# Patient Record
Sex: MALE | Race: WHITE | Employment: FULL TIME | URBAN - NONMETROPOLITAN AREA
[De-identification: names, ages, dates, MRNs, and addresses within clinical notes are randomized per-mention and may not be internally consistent; named-entity substitution may affect disease eponyms.]

---

## 2022-09-19 ENCOUNTER — HOSPITAL ENCOUNTER (INPATIENT)
Age: 42
LOS: 3 days | Discharge: LEFT BEFORE DISCHARGE | DRG: 897 | End: 2022-09-22
Attending: STUDENT IN AN ORGANIZED HEALTH CARE EDUCATION/TRAINING PROGRAM | Admitting: PEDIATRICS

## 2022-09-19 DIAGNOSIS — F10.930 ALCOHOL WITHDRAWAL SYNDROME WITHOUT COMPLICATION (HCC): ICD-10-CM

## 2022-09-19 DIAGNOSIS — R45.851 SUICIDAL IDEATION: Primary | ICD-10-CM

## 2022-09-19 LAB
ACETAMINOPHEN LEVEL: < 5 UG/ML (ref 0–20)
ALBUMIN SERPL-MCNC: 4.5 G/DL (ref 3.5–5.1)
ALP BLD-CCNC: 109 U/L (ref 38–126)
ALT SERPL-CCNC: 20 U/L (ref 11–66)
AMPHETAMINE+METHAMPHETAMINE URINE SCREEN: NEGATIVE
ANION GAP SERPL CALCULATED.3IONS-SCNC: 16 MEQ/L (ref 8–16)
AST SERPL-CCNC: 32 U/L (ref 5–40)
BACTERIA: ABNORMAL /HPF
BARBITURATE QUANTITATIVE URINE: NEGATIVE
BASOPHILS # BLD: 0.6 %
BASOPHILS ABSOLUTE: 0.1 THOU/MM3 (ref 0–0.1)
BENZODIAZEPINE QUANTITATIVE URINE: NEGATIVE
BILIRUB SERPL-MCNC: 0.6 MG/DL (ref 0.3–1.2)
BILIRUBIN URINE: NEGATIVE
BLOOD, URINE: NEGATIVE
BUN BLDV-MCNC: 16 MG/DL (ref 7–22)
CALCIUM SERPL-MCNC: 8.3 MG/DL (ref 8.5–10.5)
CANNABINOID QUANTITATIVE URINE: NEGATIVE
CASTS 2: ABNORMAL /LPF
CASTS UA: ABNORMAL /LPF
CHARACTER, URINE: ABNORMAL
CHLORIDE BLD-SCNC: 102 MEQ/L (ref 98–111)
CO2: 23 MEQ/L (ref 23–33)
COCAINE METABOLITE QUANTITATIVE URINE: NEGATIVE
COLOR: YELLOW
CREAT SERPL-MCNC: 0.6 MG/DL (ref 0.4–1.2)
CRYSTALS, UA: ABNORMAL
EKG ATRIAL RATE: 94 BPM
EKG P AXIS: 58 DEGREES
EKG P-R INTERVAL: 138 MS
EKG Q-T INTERVAL: 362 MS
EKG QRS DURATION: 92 MS
EKG QTC CALCULATION (BAZETT): 452 MS
EKG R AXIS: 66 DEGREES
EKG T AXIS: 61 DEGREES
EKG VENTRICULAR RATE: 94 BPM
EOSINOPHIL # BLD: 2.4 %
EOSINOPHILS ABSOLUTE: 0.3 THOU/MM3 (ref 0–0.4)
EPITHELIAL CELLS, UA: ABNORMAL /HPF
ERYTHROCYTE [DISTWIDTH] IN BLOOD BY AUTOMATED COUNT: 13.7 % (ref 11.5–14.5)
ERYTHROCYTE [DISTWIDTH] IN BLOOD BY AUTOMATED COUNT: 44.8 FL (ref 35–45)
ETHYL ALCOHOL, SERUM: 0.32 %
GFR SERPL CREATININE-BSD FRML MDRD: > 90 ML/MIN/1.73M2
GLUCOSE BLD-MCNC: 139 MG/DL (ref 70–108)
GLUCOSE URINE: NEGATIVE MG/DL
HCT VFR BLD CALC: 45.7 % (ref 42–52)
HEMOGLOBIN: 15.6 GM/DL (ref 14–18)
IMMATURE GRANS (ABS): 0.02 THOU/MM3 (ref 0–0.07)
IMMATURE GRANULOCYTES: 0.2 %
KETONES, URINE: NEGATIVE
LEUKOCYTE ESTERASE, URINE: NEGATIVE
LYMPHOCYTES # BLD: 18.3 %
LYMPHOCYTES ABSOLUTE: 2.1 THOU/MM3 (ref 1–4.8)
MCH RBC QN AUTO: 30.6 PG (ref 26–33)
MCHC RBC AUTO-ENTMCNC: 34.1 GM/DL (ref 32.2–35.5)
MCV RBC AUTO: 89.6 FL (ref 80–94)
MISCELLANEOUS 2: ABNORMAL
MONOCYTES # BLD: 3.6 %
MONOCYTES ABSOLUTE: 0.4 THOU/MM3 (ref 0.4–1.3)
NITRITE, URINE: NEGATIVE
NUCLEATED RED BLOOD CELLS: 0 /100 WBC
OPIATES, URINE: NEGATIVE
OSMOLALITY CALCULATION: 284.7 MOSMOL/KG (ref 275–300)
OXYCODONE: NEGATIVE
PH UA: 7 (ref 5–9)
PHENCYCLIDINE QUANTITATIVE URINE: NEGATIVE
PLATELET # BLD: 232 THOU/MM3 (ref 130–400)
PMV BLD AUTO: 10.2 FL (ref 9.4–12.4)
POTASSIUM REFLEX MAGNESIUM: 3.9 MEQ/L (ref 3.5–5.2)
PROTEIN UA: ABNORMAL
RBC # BLD: 5.1 MILL/MM3 (ref 4.7–6.1)
RBC URINE: ABNORMAL /HPF
RENAL EPITHELIAL, UA: ABNORMAL
SALICYLATE, SERUM: < 0.3 MG/DL (ref 2–10)
SARS-COV-2, NAAT: NOT  DETECTED
SEG NEUTROPHILS: 74.9 %
SEGMENTED NEUTROPHILS ABSOLUTE COUNT: 8.6 THOU/MM3 (ref 1.8–7.7)
SODIUM BLD-SCNC: 141 MEQ/L (ref 135–145)
SPECIFIC GRAVITY, URINE: 1.02 (ref 1–1.03)
TOTAL PROTEIN: 6.9 G/DL (ref 6.1–8)
UROBILINOGEN, URINE: 1 EU/DL (ref 0–1)
WBC # BLD: 11.5 THOU/MM3 (ref 4.8–10.8)
WBC UA: ABNORMAL /HPF
YEAST: ABNORMAL

## 2022-09-19 PROCEDURE — 99223 1ST HOSP IP/OBS HIGH 75: CPT | Performed by: PEDIATRICS

## 2022-09-19 PROCEDURE — 2060000000 HC ICU INTERMEDIATE R&B

## 2022-09-19 PROCEDURE — 80179 DRUG ASSAY SALICYLATE: CPT

## 2022-09-19 PROCEDURE — 36415 COLL VENOUS BLD VENIPUNCTURE: CPT

## 2022-09-19 PROCEDURE — 99285 EMERGENCY DEPT VISIT HI MDM: CPT

## 2022-09-19 PROCEDURE — 81001 URINALYSIS AUTO W/SCOPE: CPT

## 2022-09-19 PROCEDURE — 87635 SARS-COV-2 COVID-19 AMP PRB: CPT

## 2022-09-19 PROCEDURE — 80307 DRUG TEST PRSMV CHEM ANLYZR: CPT

## 2022-09-19 PROCEDURE — 85025 COMPLETE CBC W/AUTO DIFF WBC: CPT

## 2022-09-19 PROCEDURE — 93010 ELECTROCARDIOGRAM REPORT: CPT | Performed by: INTERNAL MEDICINE

## 2022-09-19 PROCEDURE — 80143 DRUG ASSAY ACETAMINOPHEN: CPT

## 2022-09-19 PROCEDURE — 93005 ELECTROCARDIOGRAM TRACING: CPT | Performed by: STUDENT IN AN ORGANIZED HEALTH CARE EDUCATION/TRAINING PROGRAM

## 2022-09-19 PROCEDURE — 96372 THER/PROPH/DIAG INJ SC/IM: CPT

## 2022-09-19 PROCEDURE — 80053 COMPREHEN METABOLIC PANEL: CPT

## 2022-09-19 PROCEDURE — 82077 ASSAY SPEC XCP UR&BREATH IA: CPT

## 2022-09-19 PROCEDURE — 6360000002 HC RX W HCPCS: Performed by: STUDENT IN AN ORGANIZED HEALTH CARE EDUCATION/TRAINING PROGRAM

## 2022-09-19 RX ORDER — MIDAZOLAM HYDROCHLORIDE 10 MG/2ML
5 INJECTION, SOLUTION INTRAMUSCULAR; INTRAVENOUS ONCE
Status: COMPLETED | OUTPATIENT
Start: 2022-09-19 | End: 2022-09-19

## 2022-09-19 RX ADMIN — MIDAZOLAM 5 MG: 5 INJECTION INTRAMUSCULAR; INTRAVENOUS at 16:27

## 2022-09-19 RX ADMIN — MIDAZOLAM 5 MG: 5 INJECTION INTRAMUSCULAR; INTRAVENOUS at 21:10

## 2022-09-19 ASSESSMENT — ENCOUNTER SYMPTOMS
EYES NEGATIVE: 1
RESPIRATORY NEGATIVE: 1
ALLERGIC/IMMUNOLOGIC NEGATIVE: 1
GASTROINTESTINAL NEGATIVE: 1

## 2022-09-19 ASSESSMENT — SLEEP AND FATIGUE QUESTIONNAIRES
DO YOU HAVE DIFFICULTY SLEEPING: YES
AVERAGE NUMBER OF SLEEP HOURS: 6
SLEEP PATTERN: DISTURBED/INTERRUPTED SLEEP;DIFFICULTY FALLING ASLEEP
DO YOU USE A SLEEP AID: NO

## 2022-09-19 ASSESSMENT — LIFESTYLE VARIABLES
HOW MANY STANDARD DRINKS CONTAINING ALCOHOL DO YOU HAVE ON A TYPICAL DAY: 10 OR MORE
HOW OFTEN DO YOU HAVE A DRINK CONTAINING ALCOHOL: 4 OR MORE TIMES A WEEK

## 2022-09-19 ASSESSMENT — PATIENT HEALTH QUESTIONNAIRE - PHQ9: SUM OF ALL RESPONSES TO PHQ QUESTIONS 1-9: 22

## 2022-09-19 NOTE — ED PROVIDER NOTES
[place] ENCOUNTER          Pt Name: Nallely Cortes  MRN: [id number]  [place] 1980  Date of evaluation: 9/19/2022  Treating Resident Physician: Bertha Santizo MD  Supervising Physician: Dr. Moo Sagastume       Chief Complaint   Patient presents with    Suicidal     History obtained from the patient. HISTORY OF PRESENT ILLNESS    HPI  Nallely Cortes is a 43 y.o. male with PMHx of depression who presents to the emergency department for evaluation of suicidal ideation. The patient states that over the last 2 weeks he has been drinking heavily stating that he is trying to drink himself to death. He has had suicidal ideation at this time and has multiple plans including drinking heavily which she states is his slow plan and is fast plan is to get a gun to shoot himself in the head. The patient has access to firearms at his parents's stores but does not carry firearms in his own home. He states that he is also been driving inebriated around town and that he is concerned that he may hurt someone. For this reason he presents emergency department on his own. He denies any toxic ingestions or attempting overdose with any medications. He has been drinking about 2/5 of whiskey a day. States that he recently was diagnosed with malignancy of the throat and this has been causing him chronic and worsening pain. He denies homicidal ideation, hallucinations, chest pain, shortness of breath. States that he also has a history of \"alcoholic epilepsy\" and has had 2 seizures in the last 24 hours when he was heavily inebriated. States that he is not taking any antiepileptic drugs. The patient has no other acute complaints at this time. REVIEW OF SYSTEMS   Review of Systems   Constitutional: Negative. HENT: Negative. Eyes: Negative. Respiratory: Negative. Cardiovascular: Negative. Gastrointestinal: Negative. Endocrine: Negative. Genitourinary: Negative. Musculoskeletal: Negative. Skin: Negative. Allergic/Immunologic: Negative. Neurological:  Positive for seizures. Hematological: Negative. Psychiatric/Behavioral:  Positive for suicidal ideas. Negative for decreased concentration, hallucinations and sleep disturbance. PAST MEDICAL AND SURGICAL HISTORY   No past medical history on file. No past surgical history on file. MEDICATIONS   No current facility-administered medications for this encounter. No current outpatient medications on file. SOCIAL HISTORY     Social History     Social History Narrative    Not on file            ALLERGIES   No Known Allergies      FAMILY HISTORY   No family history on file. PREVIOUS RECORDS   Previous records reviewed: I reviewed the patient's past medical records including relevant labs, imaging and procedures. \        PHYSICAL EXAM     ED Triage Vitals [09/19/22 1404]   BP Temp Temp Source Heart Rate Resp SpO2 Height Weight   (!) 133/98 97.5 °F (36.4 °C) Oral 89 18 96 % 6' 4\" (1.93 m) 155 lb (70.3 kg)     Initial vital signs and nursing assessment reviewed and abnormal from hypertension . Body mass index is 18.87 kg/m². Pulsoximetry is normal per my interpretation. Additional Vital Signs:  Vitals:    09/19/22 1920   BP: 129/88   Pulse: 79   Resp: 17   Temp: 97.7 °F (36.5 °C)   SpO2: 100%       Physical Exam  Constitutional:       General: He is not in acute distress. Appearance: Normal appearance. He is not ill-appearing or diaphoretic. HENT:      Head: Normocephalic and atraumatic. Right Ear: External ear normal.      Left Ear: External ear normal.      Nose: Nose normal.      Mouth/Throat:      Mouth: Mucous membranes are moist.      Pharynx: Oropharynx is clear. Eyes:      General: No scleral icterus. Conjunctiva/sclera: Conjunctivae normal.   Cardiovascular:      Rate and Rhythm: Normal rate and regular rhythm. Pulses: Normal pulses. Heart sounds: Normal heart sounds. Pulmonary:      Effort: Pulmonary effort is normal.      Breath sounds: Normal breath sounds. Abdominal:      General: Abdomen is flat. Palpations: Abdomen is soft. Skin:     General: Skin is warm and dry. Capillary Refill: Capillary refill takes less than 2 seconds. Neurological:      General: No focal deficit present. Mental Status: He is alert and oriented to person, place, and time. Mental status is at baseline.    Psychiatric:         Mood and Affect: Mood normal.         Behavior: Behavior normal.           ED RESULTS   Laboratory results:  Labs Reviewed   CBC WITH AUTO DIFFERENTIAL - Abnormal; Notable for the following components:       Result Value    WBC 11.5 (*)     Segs Absolute 8.6 (*)     All other components within normal limits   COMPREHENSIVE METABOLIC PANEL W/ REFLEX TO MG FOR LOW K - Abnormal; Notable for the following components:    Glucose 139 (*)     Calcium 8.3 (*)     All other components within normal limits   SALICYLATE LEVEL - Abnormal; Notable for the following components:    Salicylate, Serum < 0.3 (*)     All other components within normal limits   URINE WITH REFLEXED MICRO - Abnormal; Notable for the following components:    Protein, UA TRACE (*)     Character, Urine CLOUDY (*)     All other components within normal limits   COVID-19, RAPID   URINE DRUG SCREEN   ETHANOL   ACETAMINOPHEN LEVEL   ANION GAP   GLOMERULAR FILTRATION RATE, ESTIMATED   OSMOLALITY       Radiologic studies results:  No orders to display       ED Medications administered this visit:   Medications   midazolam PF (VERSED) injection 5 mg (5 mg IntraMUSCular Given 9/19/22 1627)   midazolam PF (VERSED) injection 5 mg (5 mg IntraMUSCular Given 9/19/22 2110)         ED COURSE     ED Course as of 09/19/22 2243   Mon Sep 19, 2022   1927 ETHYL ALCOHOL, SERUM: 0.32 [JT]      ED Course User Index  [JT] Loni Pimentel MD           MEDICAL DECISION MAKING   Initial Assessment: This is a 80-year-old male coming in with suicidal ideation with multiple plans and access to firearms. No homicidal ideation or auditory or visual hallucinations elicited. Patient appears well on exam.  Reassuring and unremarkable physical exam.    Psychiatric clearance labs  GALILEA consultation  [address]      Given the patient's above chief complaint and findings on history and physical examination, I thought it was appropriate to consider the following emergency medical conditions:  Suicidal ideation, mental health crisis, alcohol use disorder  Although some of these diagnoses are unlikely they were considered in my medical decision making. MDM: Patient represents an immediate and likely threat to self and therefore will require [address] [place]. He has multiple plans in place and does have access to firearms and also has a new chronic diagnosis of malignancy and is also binge drinking. He also endorses a history of seizures induced by alcohol and states that he might of had seizures, no seizure-like activity noticed here and no history of seizures seen in his medical charts. Versed given for mild agitation. During patient's course here in the emergency department, he developed more agitation and began also developing resting tremors. Given that the patient has been binge drinking chronically and has not had any EtOH or in the emergency department I suspect that he is going into withdrawal.  No perceptual disturbances and no seizures noted here. However, it would be appropriate to admit the patient for alcohol withdrawal before he is medically cleared for psychiatric admission. I did page the admission to the hospitalist Dr. Camille Hensley who graciously excepted the admission. Patient care is signed out to the ED resident on.       MEDICATION CHANGES     New Prescriptions    No medications on file         FINAL DISPOSITION     Final diagnoses:   Suicidal ideation   Alcohol withdrawal syndrome without complication ([facility] [address]     Condition: condition: fair  Dispo: Patient care signed out to ED resident      This transcription was electronically signed. Parts of this transcriptions may have been dictated by use of voice recognition software and electronically transcribed, and parts may have been transcribed with the assistance of an ED scribe. The transcription may contain errors not detected in proofreading. Please refer to my supervising physician's documentation if my documentation differs.     Electronically Signed: Amada Ward MD, 09/19/22, 10:43 PM         Amada Ward MD  Resident  09/19/22 0921

## 2022-09-19 NOTE — ED TRIAGE NOTES
Pt presents to the ED through triage with c.c suicidal thoughts. Pt states he has been drinking alcohol heavily in attempt to end his life. Pt reports that he also thinks about shooting himself in the head. Pt reports last drink was today at 0800. Mother at bedside. Level A paged. Patient placed in safe room that is ligature resistant with continuous monitoring in place. Provider notified, requested an assessment by behavioral health . Patient belongings secured in a locked lockers outside of the room. Explained suicide prevention precautions to the patient including constant observer.

## 2022-09-19 NOTE — PROGRESS NOTES
Chief Complaint:   Suicidal      Provisional Diagnosis: Major Depressive Disorder      Risk, Psychosocial and Contextual Factors: Limited support, no outpatient provider      Current  Treatment: Denies      Present Suicidal Behavior:    Verbal: Yes    Attempt: Denies      Access to Weapons: Denies      C-SSRS Current Suicide Risk: Low, Moderate or High: High      Past Suicidal Behavior:    Verbal: Yes    Attempts: Denies      Self-Injurious/Self-Mutilation: Denies      Traumatic Event Within Past 2 Weeks: Recently diagnosed with throat cancer      Current Abuse:  Denies      Legal: Denies      Violence: Denies      Protective Factors: Motivated for change      Housing: Lives alone in [address] [place] [address] Summary:      Patient is a 43year old male who presents to ED voluntarily reporting suicidal thoughts. Patient reports he has been attempting to drink himself to death for the past two weeks. Patient states \"If I had access to a gun I would have shot myself in the head before coming here\". Patient explains he has limited support as a single male and he recently received a throat cancer diagnosis which he has struggled with. Patient denies any homicidal ideation, denies hallucinations. Patient reports history of opiate abuse in the early 2000s which he did receive inpatient treatment for. Patient reports he had a seizure last night around 9pm and woke up on the ground 12 hours later. Patient states this happened twice yesterday. Patient reports alcohol use regularly. Patient reports he gave his keys to his friend for fear he was going to drive and harm himself or someone else. Level of Care Disposition:      1510: Patient placed on [address] [facility] by Dr. Chris Gomes.     3277: Patient becoming agitated, pacing safe rooms and asking to go home. 1604: Patient continues to pace the safe room, verbally escalating. Medical provider aware. 1618: Patient updated on POC. EMC explained as well as BAL being . 32. 0910: Patient voluntarily medicated with no complications. 1736: Patient resting in bed at this time. 1818: Patient given meal tray. No further needs expressed at this time. 1900: Patient continues resting in bed at this time. Handover to third shift clinician.

## 2022-09-20 LAB
ANION GAP SERPL CALCULATED.3IONS-SCNC: 7 MEQ/L (ref 8–16)
BASOPHILS # BLD: 0.3 %
BASOPHILS ABSOLUTE: 0 THOU/MM3 (ref 0–0.1)
BUN BLDV-MCNC: 18 MG/DL (ref 7–22)
CALCIUM SERPL-MCNC: 8.5 MG/DL (ref 8.5–10.5)
CHLORIDE BLD-SCNC: 102 MEQ/L (ref 98–111)
CO2: 26 MEQ/L (ref 23–33)
CREAT SERPL-MCNC: 0.6 MG/DL (ref 0.4–1.2)
EOSINOPHIL # BLD: 2.9 %
EOSINOPHILS ABSOLUTE: 0.2 THOU/MM3 (ref 0–0.4)
ERYTHROCYTE [DISTWIDTH] IN BLOOD BY AUTOMATED COUNT: 13.3 % (ref 11.5–14.5)
ERYTHROCYTE [DISTWIDTH] IN BLOOD BY AUTOMATED COUNT: 44.1 FL (ref 35–45)
GFR SERPL CREATININE-BSD FRML MDRD: > 90 ML/MIN/1.73M2
GLUCOSE BLD-MCNC: 112 MG/DL (ref 70–108)
HCT VFR BLD CALC: 42.8 % (ref 42–52)
HEMOGLOBIN: 14.4 GM/DL (ref 14–18)
IMMATURE GRANS (ABS): 0.01 THOU/MM3 (ref 0–0.07)
IMMATURE GRANULOCYTES: 0.1 %
LYMPHOCYTES # BLD: 18.3 %
LYMPHOCYTES ABSOLUTE: 1.3 THOU/MM3 (ref 1–4.8)
MAGNESIUM: 1.6 MG/DL (ref 1.6–2.4)
MCH RBC QN AUTO: 30.3 PG (ref 26–33)
MCHC RBC AUTO-ENTMCNC: 33.6 GM/DL (ref 32.2–35.5)
MCV RBC AUTO: 89.9 FL (ref 80–94)
MONOCYTES # BLD: 6.2 %
MONOCYTES ABSOLUTE: 0.4 THOU/MM3 (ref 0.4–1.3)
NUCLEATED RED BLOOD CELLS: 0 /100 WBC
OSMOLALITY CALCULATION: 272.8 MOSMOL/KG (ref 275–300)
PLATELET # BLD: 156 THOU/MM3 (ref 130–400)
PMV BLD AUTO: 10.6 FL (ref 9.4–12.4)
POTASSIUM REFLEX MAGNESIUM: 4.2 MEQ/L (ref 3.5–5.2)
RBC # BLD: 4.76 MILL/MM3 (ref 4.7–6.1)
SEG NEUTROPHILS: 72.2 %
SEGMENTED NEUTROPHILS ABSOLUTE COUNT: 5 THOU/MM3 (ref 1.8–7.7)
SODIUM BLD-SCNC: 135 MEQ/L (ref 135–145)
WBC # BLD: 6.9 THOU/MM3 (ref 4.8–10.8)

## 2022-09-20 PROCEDURE — 6360000002 HC RX W HCPCS: Performed by: PHYSICIAN ASSISTANT

## 2022-09-20 PROCEDURE — 80048 BASIC METABOLIC PNL TOTAL CA: CPT

## 2022-09-20 PROCEDURE — 2580000003 HC RX 258: Performed by: PHYSICIAN ASSISTANT

## 2022-09-20 PROCEDURE — 2580000003 HC RX 258: Performed by: PEDIATRICS

## 2022-09-20 PROCEDURE — 99232 SBSQ HOSP IP/OBS MODERATE 35: CPT | Performed by: PHYSICIAN ASSISTANT

## 2022-09-20 PROCEDURE — 36415 COLL VENOUS BLD VENIPUNCTURE: CPT

## 2022-09-20 PROCEDURE — 6370000000 HC RX 637 (ALT 250 FOR IP): Performed by: PEDIATRICS

## 2022-09-20 PROCEDURE — 6360000002 HC RX W HCPCS: Performed by: PEDIATRICS

## 2022-09-20 PROCEDURE — 93005 ELECTROCARDIOGRAM TRACING: CPT | Performed by: PHYSICIAN ASSISTANT

## 2022-09-20 PROCEDURE — 2060000000 HC ICU INTERMEDIATE R&B

## 2022-09-20 PROCEDURE — 85025 COMPLETE CBC W/AUTO DIFF WBC: CPT

## 2022-09-20 PROCEDURE — 83735 ASSAY OF MAGNESIUM: CPT

## 2022-09-20 RX ORDER — LORAZEPAM 1 MG/1
4 TABLET ORAL
Status: DISCONTINUED | OUTPATIENT
Start: 2022-09-20 | End: 2022-09-20

## 2022-09-20 RX ORDER — ONDANSETRON 4 MG/1
4 TABLET, ORALLY DISINTEGRATING ORAL EVERY 8 HOURS PRN
Status: DISCONTINUED | OUTPATIENT
Start: 2022-09-20 | End: 2022-09-22 | Stop reason: HOSPADM

## 2022-09-20 RX ORDER — MULTIVITAMIN WITH IRON
1 TABLET ORAL DAILY
Status: DISCONTINUED | OUTPATIENT
Start: 2022-09-20 | End: 2022-09-22 | Stop reason: HOSPADM

## 2022-09-20 RX ORDER — FOLIC ACID 1 MG/1
1 TABLET ORAL DAILY
Status: DISCONTINUED | OUTPATIENT
Start: 2022-09-20 | End: 2022-09-22 | Stop reason: HOSPADM

## 2022-09-20 RX ORDER — SODIUM CHLORIDE 9 MG/ML
INJECTION, SOLUTION INTRAVENOUS PRN
Status: DISCONTINUED | OUTPATIENT
Start: 2022-09-20 | End: 2022-09-22 | Stop reason: HOSPADM

## 2022-09-20 RX ORDER — LISINOPRIL 10 MG/1
20 TABLET ORAL DAILY
COMMUNITY

## 2022-09-20 RX ORDER — MAGNESIUM HYDROXIDE/ALUMINUM HYDROXICE/SIMETHICONE 120; 1200; 1200 MG/30ML; MG/30ML; MG/30ML
30 SUSPENSION ORAL EVERY 6 HOURS PRN
Status: DISCONTINUED | OUTPATIENT
Start: 2022-09-20 | End: 2022-09-22 | Stop reason: HOSPADM

## 2022-09-20 RX ORDER — LORAZEPAM 1 MG/1
3 TABLET ORAL
Status: DISCONTINUED | OUTPATIENT
Start: 2022-09-20 | End: 2022-09-20

## 2022-09-20 RX ORDER — OXYCODONE HYDROCHLORIDE AND ACETAMINOPHEN 5; 325 MG/1; MG/1
1 TABLET ORAL EVERY 8 HOURS PRN
Status: DISCONTINUED | OUTPATIENT
Start: 2022-09-20 | End: 2022-09-22 | Stop reason: HOSPADM

## 2022-09-20 RX ORDER — LORAZEPAM 1 MG/1
1 TABLET ORAL
Status: DISCONTINUED | OUTPATIENT
Start: 2022-09-20 | End: 2022-09-20

## 2022-09-20 RX ORDER — ONDANSETRON 2 MG/ML
4 INJECTION INTRAMUSCULAR; INTRAVENOUS EVERY 6 HOURS PRN
Status: DISCONTINUED | OUTPATIENT
Start: 2022-09-20 | End: 2022-09-22 | Stop reason: HOSPADM

## 2022-09-20 RX ORDER — LORAZEPAM 2 MG/ML
3 INJECTION INTRAMUSCULAR
Status: DISCONTINUED | OUTPATIENT
Start: 2022-09-20 | End: 2022-09-20

## 2022-09-20 RX ORDER — SODIUM CHLORIDE 9 MG/ML
INJECTION, SOLUTION INTRAVENOUS CONTINUOUS
Status: DISCONTINUED | OUTPATIENT
Start: 2022-09-20 | End: 2022-09-22

## 2022-09-20 RX ORDER — METOPROLOL SUCCINATE 25 MG/1
25 TABLET, EXTENDED RELEASE ORAL DAILY
COMMUNITY
Start: 2022-06-28

## 2022-09-20 RX ORDER — POTASSIUM CHLORIDE 20 MEQ/1
40 TABLET, EXTENDED RELEASE ORAL PRN
Status: DISCONTINUED | OUTPATIENT
Start: 2022-09-20 | End: 2022-09-22 | Stop reason: HOSPADM

## 2022-09-20 RX ORDER — ENOXAPARIN SODIUM 100 MG/ML
40 INJECTION SUBCUTANEOUS DAILY
Status: DISCONTINUED | OUTPATIENT
Start: 2022-09-20 | End: 2022-09-22 | Stop reason: HOSPADM

## 2022-09-20 RX ORDER — LORAZEPAM 2 MG/ML
4 INJECTION INTRAMUSCULAR
Status: DISCONTINUED | OUTPATIENT
Start: 2022-09-20 | End: 2022-09-20

## 2022-09-20 RX ORDER — KETOROLAC TROMETHAMINE 30 MG/ML
30 INJECTION, SOLUTION INTRAMUSCULAR; INTRAVENOUS EVERY 6 HOURS PRN
Status: DISCONTINUED | OUTPATIENT
Start: 2022-09-20 | End: 2022-09-22 | Stop reason: HOSPADM

## 2022-09-20 RX ORDER — MAGNESIUM SULFATE IN WATER 40 MG/ML
2000 INJECTION, SOLUTION INTRAVENOUS PRN
Status: DISCONTINUED | OUTPATIENT
Start: 2022-09-20 | End: 2022-09-22 | Stop reason: HOSPADM

## 2022-09-20 RX ORDER — SODIUM CHLORIDE 0.9 % (FLUSH) 0.9 %
5-40 SYRINGE (ML) INJECTION EVERY 12 HOURS SCHEDULED
Status: DISCONTINUED | OUTPATIENT
Start: 2022-09-20 | End: 2022-09-22 | Stop reason: HOSPADM

## 2022-09-20 RX ORDER — LORAZEPAM 1 MG/1
2 TABLET ORAL
Status: DISCONTINUED | OUTPATIENT
Start: 2022-09-20 | End: 2022-09-20

## 2022-09-20 RX ORDER — SODIUM CHLORIDE 0.9 % (FLUSH) 0.9 %
5-40 SYRINGE (ML) INJECTION PRN
Status: DISCONTINUED | OUTPATIENT
Start: 2022-09-20 | End: 2022-09-22 | Stop reason: HOSPADM

## 2022-09-20 RX ORDER — POTASSIUM CHLORIDE 7.45 MG/ML
10 INJECTION INTRAVENOUS PRN
Status: DISCONTINUED | OUTPATIENT
Start: 2022-09-20 | End: 2022-09-22 | Stop reason: HOSPADM

## 2022-09-20 RX ORDER — PHENOBARBITAL 32.4 MG/1
32.4 TABLET ORAL 2 TIMES DAILY
Status: DISCONTINUED | OUTPATIENT
Start: 2022-09-21 | End: 2022-09-22 | Stop reason: HOSPADM

## 2022-09-20 RX ORDER — LANOLIN ALCOHOL/MO/W.PET/CERES
100 CREAM (GRAM) TOPICAL DAILY
Status: DISCONTINUED | OUTPATIENT
Start: 2022-09-20 | End: 2022-09-22 | Stop reason: HOSPADM

## 2022-09-20 RX ORDER — LORAZEPAM 2 MG/ML
1 INJECTION INTRAMUSCULAR
Status: DISCONTINUED | OUTPATIENT
Start: 2022-09-20 | End: 2022-09-20

## 2022-09-20 RX ORDER — LORAZEPAM 2 MG/ML
2 INJECTION INTRAMUSCULAR
Status: DISCONTINUED | OUTPATIENT
Start: 2022-09-20 | End: 2022-09-20

## 2022-09-20 RX ORDER — PHENOBARBITAL 32.4 MG/1
64.8 TABLET ORAL 2 TIMES DAILY
Status: COMPLETED | OUTPATIENT
Start: 2022-09-21 | End: 2022-09-21

## 2022-09-20 RX ORDER — POLYETHYLENE GLYCOL 3350 17 G/17G
17 POWDER, FOR SOLUTION ORAL DAILY PRN
Status: DISCONTINUED | OUTPATIENT
Start: 2022-09-20 | End: 2022-09-22 | Stop reason: HOSPADM

## 2022-09-20 RX ADMIN — PHENOBARBITAL SODIUM 347.1 MG: 65 INJECTION INTRAMUSCULAR; INTRAVENOUS at 10:42

## 2022-09-20 RX ADMIN — SODIUM CHLORIDE: 9 INJECTION, SOLUTION INTRAVENOUS at 16:40

## 2022-09-20 RX ADMIN — PHENOBARBITAL SODIUM 347.1 MG: 65 INJECTION INTRAMUSCULAR; INTRAVENOUS at 17:25

## 2022-09-20 RX ADMIN — LORAZEPAM 1 MG: 2 INJECTION INTRAMUSCULAR; INTRAVENOUS at 02:01

## 2022-09-20 RX ADMIN — Medication 100 MG: at 09:44

## 2022-09-20 RX ADMIN — LORAZEPAM 2 MG: 2 INJECTION INTRAMUSCULAR; INTRAVENOUS at 05:18

## 2022-09-20 RX ADMIN — ONDANSETRON 4 MG: 2 INJECTION INTRAMUSCULAR; INTRAVENOUS at 01:59

## 2022-09-20 RX ADMIN — FOLIC ACID 1 MG: 1 TABLET ORAL at 09:44

## 2022-09-20 RX ADMIN — LORAZEPAM 2 MG: 2 INJECTION INTRAMUSCULAR; INTRAVENOUS at 08:14

## 2022-09-20 RX ADMIN — KETOROLAC TROMETHAMINE 30 MG: 30 INJECTION, SOLUTION INTRAMUSCULAR; INTRAVENOUS at 03:22

## 2022-09-20 RX ADMIN — SODIUM CHLORIDE: 9 INJECTION, SOLUTION INTRAVENOUS at 01:55

## 2022-09-20 RX ADMIN — Medication 1 TABLET: at 09:44

## 2022-09-20 RX ADMIN — PHENOBARBITAL SODIUM 347.1 MG: 65 INJECTION INTRAMUSCULAR; INTRAVENOUS at 21:51

## 2022-09-20 ASSESSMENT — PAIN DESCRIPTION - LOCATION
LOCATION: JAW
LOCATION: NECK
LOCATION: JAW
LOCATION: JAW

## 2022-09-20 ASSESSMENT — PAIN SCALES - GENERAL
PAINLEVEL_OUTOF10: 7
PAINLEVEL_OUTOF10: 4
PAINLEVEL_OUTOF10: 8
PAINLEVEL_OUTOF10: 7
PAINLEVEL_OUTOF10: 0
PAINLEVEL_OUTOF10: 8

## 2022-09-20 ASSESSMENT — PAIN DESCRIPTION - FREQUENCY: FREQUENCY: CONTINUOUS

## 2022-09-20 ASSESSMENT — PAIN DESCRIPTION - ORIENTATION
ORIENTATION: LEFT

## 2022-09-20 ASSESSMENT — PAIN DESCRIPTION - PAIN TYPE
TYPE: CHRONIC PAIN
TYPE: ACUTE PAIN

## 2022-09-20 ASSESSMENT — PAIN DESCRIPTION - DESCRIPTORS: DESCRIPTORS: SHARP

## 2022-09-20 ASSESSMENT — PAIN - FUNCTIONAL ASSESSMENT
PAIN_FUNCTIONAL_ASSESSMENT: 0-10
PAIN_FUNCTIONAL_ASSESSMENT: NONE - DENIES PAIN

## 2022-09-20 ASSESSMENT — PAIN DESCRIPTION - ONSET: ONSET: ON-GOING

## 2022-09-20 NOTE — ED NOTES
ED to inpatient nurses report    Chief Complaint   Patient presents with    Suicidal      Present to ED from home  LOC: alert and orientated to name and place  Vital signs   Vitals:    09/20/22 0621 09/20/22 0623 09/20/22 0807 09/20/22 1257   BP: 117/80 117/80 115/75 (!) 146/101   Pulse:  68 83 64   Resp:  18 19 15   Temp:       TempSrc:       SpO2:  94% 95% 98%   Weight:       Height:          Oxygen Baseline 98%    Current needs required RA Bipap/Cpap No  LDAs:   Peripheral IV 09/20/22 Left Forearm (Active)   Site Assessment Clean, dry & intact 09/20/22 0154   Line Status Brisk blood return;Normal saline locked 09/20/22 0154     Mobility: Independent  Pending ED orders: AN  Present condition: stable  C-SSRS Risk of Suicide: High Risk  Swallow Screening    Preferred Language: [place]     Electronically signed by Rene Gonzalez RN on 9/20/2022 at 3:17 PM       Rene Gonzalez RN  09/20/22 5566

## 2022-09-20 NOTE — PROGRESS NOTES
Hospitalist Progress Note      Patient:  Love Pierce    Unit/Bed:40/040A  YOB: 1980  MRN: [id number]   Acct: [de-identified]   PCP: No primary care provider on file. Date of Admission: 9/19/2022    Assessment/Plan:    Suicidal Ideation: Pt is EMCed. Psych consulted. 1:1 sitter. Pt is confused at times and demanding to leave. Pt is not alert and oriented. Alcohol WD: Prophylaxis Pheno treatment ordered; 1 of 3 IV doses have been given. Pt tolerated it okay. MVI, Folic Acid, Thiamine. Maxillofacial/Throat CA: Pt states that he has an appointment with ENT in [facility] on Thursday. Tobacco Abuse Disorder: Nicotine patch. Chief Complaint: ETOH WD     Initial H and P:-    Initial H&P \"Damaso Dougherty is a 43 y.o. male with a past medical history that is only significant for heavy alcohol consumption who presents to [address] [place] [address] with at least a 2-week history of binge drinking of alcohol consuming about two fifths of alcohol or whiskey daily on purpose with intention to end his life through drinking he says. Patient has recently been diagnosed with a form of maxillofacial or throat cancer and failed to show up to his follow-up appointments for staging. He has been having increased pain in his jaw he says that was unbearable and therefore he decided to end his life through drinking. Patient also reported to nursing staff in triage that he had thoughts about shooting himself. Patient does have access to firearms in his parents store but does not have any firearms in his own place. Denies any prior history of suicide attempts or any inpatient psychiatric hospitalization for suicide. Denies any homicidal ideation as well.   Patient also reports having withdrawal seizures at least 2 times at home within the last 24 hours with the last one being just before coming in where he was laying on the floor and able to hear the voices of the sodium chloride      sodium chloride 100 mL/hr at 09/20/22 0155     Scheduled Medications    sodium chloride flush  5-40 mL IntraVENous 2 times per day    thiamine  100 mg Oral Daily    enoxaparin  40 mg SubCUTAneous Daily    multivitamin  1 tablet Oral Daily    folic acid  1 mg Oral Daily    PHENobarbital (LUMINAL) IVPB  4 mg/kg (Ideal) IntraVENous Q4H    Followed by    PHENobarbital  64.8 mg Oral BID    Followed by    Vianney Willard ON 9/21/2022] PHENobarbital  32.4 mg Oral BID     PRN Meds: sodium chloride flush, sodium chloride, ondansetron **OR** ondansetron, polyethylene glycol, potassium chloride **OR** potassium alternative oral replacement **OR** potassium chloride, magnesium sulfate, aluminum & magnesium hydroxide-simethicone, ketorolac, oxyCODONE-acetaminophen    No intake or output data in the 24 hours ending 09/20/22 1426    Diet:  ADULT DIET; Full Liquid    Physical Exam:  BP (!) 146/101   Pulse 64   Temp 98.2 °F (36.8 °C) (Oral)   Resp 15   Ht 6' 4\" (1.93 m)   Wt 155 lb (70.3 kg)   SpO2 98%   BMI 18.87 kg/m²   General appearance: Acutely agitated, non cooperative   HEENT: Pupils equal, round, and reactive to light. Conjunctivae/corneas clear. Neck: Supple, with full range of motion. No jugular venous distention. Trachea midline. Respiratory:  Normal respiratory effort. Clear to auscultation, bilaterally without Rales/Wheezes/Rhonchi. Cardiovascular: Regular rate and rhythm with normal S1/S2 without murmurs, rubs or gallops. Abdomen: Soft, non-tender, non-distended with normal bowel sounds. Musculoskeletal: passive and active ROM x 4 extremities. Skin: Skin color, texture, turgor normal.  No rashes or lesions. Neurologic:  Neurovascularly intact without any focal sensory/motor deficits.  Cranial nerves: II-XII intact, grossly non-focal.  Psychiatric: Alert, conversationally confused   Capillary Refill: Brisk,< 3 seconds   Peripheral Pulses: +2 palpable, equal bilaterally     Labs:   Recent Labs 09/19/22  1446 09/20/22  0732   WBC 11.5* 6.9   HGB 15.6 14.4   HCT 45.7 42.8    156     Recent Labs     09/19/22  1446 09/20/22  0732    135   K 3.9 4.2    102   CO2 23 26   BUN 16 18   CREATININE 0.6 0.6   CALCIUM 8.3* 8.5     Recent Labs     09/19/22  1446   AST 32   ALT 20   BILITOT 0.6   ALKPHOS 109     Urinalysis:      Lab Results   Component Value Date/Time    NITRU NEGATIVE 09/19/2022 02:20 PM    WBCUA 2-4 09/19/2022 02:20 PM    BACTERIA NONE SEEN 09/19/2022 02:20 PM    RBCUA 3-5 09/19/2022 02:20 PM    BLOODU NEGATIVE 09/19/2022 02:20 PM    GLUCOSEU NEGATIVE 09/19/2022 02:20 PM       Radiology:  No orders to display     No results found.     Electronically signed by JULITO Grubbs on 9/20/2022 at 2:26 PM

## 2022-09-20 NOTE — ED NOTES
ED nurse-to-nurse bedside report    Chief Complaint   Patient presents with    Suicidal      LOC: alert and orientated to name, place, date  Vital signs   Vitals:    09/19/22 1404 09/19/22 1814 09/19/22 1920 09/19/22 2330   BP: (!) 133/98 (!) 134/99 129/88 (!) 133/92   Pulse: 89 80 79 70   Resp: 18 18 17 18   Temp: 97.5 °F (36.4 °C)  97.7 °F (36.5 °C) 98.1 °F (36.7 °C)   TempSrc: Oral  Oral    SpO2: 96% 96% 100% 98%   Weight: 155 lb (70.3 kg)      Height: 6' 4\" (1.93 m)         Pain:    Pain Interventions: see MAR  Pain Goal: 0  Oxygen: No    Current needs required room air    Telemetry: No  LDAs:    Continuous Infusions:    sodium chloride      sodium chloride       Mobility: Independent  Dela Cruz Fall Risk Score: No flowsheet data found. Fall Interventions: pt independent   Report given to:  Deanne Schafer, [address] [place] RN  09/20/22 2793

## 2022-09-20 NOTE — PROGRESS NOTES
Utilize [facility] alcohol withdrawal scale (Based on [place] Modified Alcohol Withdrawal Scale). Tabulate score based on classifications including Tremor, Sweating, Hallucination, Orientation, and Agitation. Tremor: 2  Sweatin  Hallucinations: 0  Orientation: 0  Agitation: 1    Total Score: 4  Action perform as described below     Tremor:  No tremor is 0 points. Tremor on movement is 1 point. Tremor at rest is 2 points. Sweating: No Sweat 0 points. Moist is 1 point. Drenching sweats is 2 points. Hallucinations: No present 0 points. Dissuadable is 1 point. Not dissuadable is 2 points. Orientation: Oriented 0 points. Vague/detached 1 point. Disoriented/no contact 2 points. Agitation: Calm 0 points. Anxious 1 point. Panicky 2 points. Patient is on scheduled pheno.

## 2022-09-20 NOTE — ED NOTES
Pt resting on cot at this time. CIWA 8 at this time. Respirations even and unlabored.  [address] [facility], RN  09/20/22 4854

## 2022-09-20 NOTE — ED NOTES
Pt resting in bed becoming anxious with tremors, pt requesting ativan at this time.      Eliza Garcia RN  09/20/22 2236

## 2022-09-20 NOTE — ED NOTES
Pt resting more comfortably at this time- CIWA completed-Pt states pain remains a 7 at this time-skin warm and dry, respirations even and unlabored-      Cathryn Yun RN  09/20/22 6071

## 2022-09-20 NOTE — ED NOTES
Pt moved to room 40- Resting on cart-c/o pain to left jaw-states he had biopsy of area one week ago and has followup Thursday. Pt states there is cancer in the area. Alert, skin warm and dry, respirations even and unlabored.      Jud Marcano RN  09/20/22 1759

## 2022-09-20 NOTE — H&P
Hospitalist History and Physical          Patient: Missy Joshi  : 1980  MRN: [id number]     Acct: [de-identified]    PCP: No primary care provider on file. Date of Admission: 2022  Date of Service: Pt seen/examined on 22  and Admitted to Inpatient with expected LOS greater than two midnights due to medical therapy. Hospital Problems             Last Modified POA    * (Principal) Alcohol withdrawal syndrome, uncomplicated ([facility] [address] [id number] Yes       Assessment and Plan:    Acute alcohol intoxication and withdrawal:  Binge drinking x2 weeks with at least two fifths of alcohol daily. Placed on alcohol withdrawal protocol in stepdown with telemetry. Multivitamins, folic acid, and thiamine daily. Fall/seizure/aspiration precautions. Psychiatry/behavioral health consultation to evaluate for inpatient detox. Suicidal ideation:  Also psychiatric evaluation to assess suicide risk and possible need for inpatient psychiatric hospitalization for treatment. Maxillofacial/throat CA:  Consider oncology consultation to finish staging if patient agreeable. Pain management with as needed Toradol and morphine. Tobacco abuse disorder:  Nicotine patches as needed. Smoking cessation counseling. DVT prophylaxis:  Subcu Lovenox. Fluid/electrolyte/nutrition:  0.9 normal saline at 75 cc an hour. Electrolytes within normal limits. Check electrolytes with magnesium in a.m. Regular diet.      =======================================================================      Chief Complaint: Alcohol intoxication and withdrawal and suicidal ideation.     History Of Present Illness:  Missy Joshi is a 43 y.o. male with a past medical history that is only significant for heavy alcohol consumption who presents to [address] [place] [address] with at least a 2-week history of binge drinking of alcohol consuming about two fifths of alcohol or whiskey daily on purpose with intention to end his life through patient was laying in bed shaking and stating that he is cold. He was able to answer all questions appropriately and participate in all aspects of history and physical exam.  Did not look in any acute distress although he does have tremors. Past Medical History:    No past medical history on file. Past Surgical History:    No past surgical history on file. Medications Prior to Admission:   Prior to Admission medications    Not on File       Allergies:  Patient has no known allergies. Social History:    The patient currently lives independently. Tobacco use:   has no history on file for tobacco use. Alcohol use:   has no history on file for alcohol use. Drug use:  has no history on file for drug use. Family History:   as follows:  No family history on file. Review of Systems:   Pertinent positives and negatives as noted in the HPI. Otherwise complete ROS negative. Physical Exam:    BP (!) 138/97   Pulse 62   Temp 98.2 °F (36.8 °C) (Oral)   Resp 15   Ht 6' 4\" (1.93 m)   Wt 155 lb (70.3 kg)   SpO2 95%   BMI 18.87 kg/m²       General appearance: Tremors and shaky, no apparent distress, appears stated age. Eyes:  Pupils equal, round, and reactive to light. Conjunctivae/corneas clear. HENT: Head normal in appearance. External nares normal.  Oral mucosa moist without lesions. Hearing grossly intact. Neck: Supple, with full range of motion. Trachea midline. No gross JVD appreciated. Respiratory:  Normal respiratory effort. Clear to auscultation, bilaterally without rales or wheezes or rhonchi. Cardiovascular: Normal rate, regular rhythm with normal S1/S2 without murmurs. No lower extremity edema. Abdomen: Soft, non-tender, non-distended with normal bowel sounds. Musculoskeletal: No joint swelling or tenderness. Normal tone. No abnormal movements. Skin: Warm and dry. No rashes or lesions. Neurologic:  No focal sensory/motor deficits in the upper and lower extremities. Cranial nerves:  grossly non-focal 2-12. Psychiatric: Alert and oriented, normal insight and thought content. Capillary Refill: Brisk,< 3 seconds. Peripheral Pulses: +2 palpable, equal bilaterally. Labs:     Recent Labs     09/19/22  1446   WBC 11.5*   HGB 15.6   HCT 45.7        Recent Labs     09/19/22  1446      K 3.9      CO2 23   BUN 16   CREATININE 0.6   CALCIUM 8.3*     Recent Labs     09/19/22  1446   AST 32   ALT 20   BILITOT 0.6   ALKPHOS 109     No results for input(s): INR in the last 72 hours. No results for input(s): Assunta Johnson in the last 72 hours. Lab Results   Component Value Date/Time    NITRU NEGATIVE 09/19/2022 02:20 PM    [address] 2-4 09/19/2022 02:20 PM    BACTERIA NONE SEEN 09/19/2022 02:20 PM    [place] 3-5 09/19/2022 02:20 PM    BLOODU NEGATIVE 09/19/2022 02:20 PM    GLUCOSEU NEGATIVE 09/19/2022 02:20 PM         Radiology:     No orders to display          EKG:  I have reviewed the EKG with the following interpretation: Normal sinus rhythm with no acute ischemic changes. Diet: ADULT DIET; Full Liquid  DVT prophylaxis: Subcu Lovenox. Code Status: Full Code  Disposition: admit to inpatient stepdown with telemetry. Thank you No primary care provider on file. for the opportunity to be involved in this patient's care.     Electronically signed by Cali Connolly MD on 9/20/2022 at 2:09 AM.

## 2022-09-20 NOTE — ED NOTES
ED nurse-to-nurse bedside report    Chief Complaint   Patient presents with    Suicidal      LOC: alert and orientated to name, place, date  Vital signs   Vitals:    09/20/22 0621 09/20/22 0623 09/20/22 0807 09/20/22 1257   BP: 117/80 117/80 115/75 (!) 146/101   Pulse:  68 83 64   Resp:  18 19 15   Temp:       TempSrc:       SpO2:  94% 95% 98%   Weight:       Height:          Pain:    Pain Interventions: see MAR  Pain Goal: 2  Oxygen: No    Current needs required RA   Telemetry: yes  LDAs:   Peripheral IV 09/20/22 Left Forearm (Active)   Site Assessment Clean, dry & intact 09/20/22 0154   Line Status Brisk blood return;Normal saline locked 09/20/22 0154     Continuous Infusions:    sodium chloride      sodium chloride 100 mL/hr at 09/20/22 0155     Mobility: Requires assistance * 1  Dela Cruz Fall Risk Score: No flowsheet data found.   Fall Interventions: call light in reach, bed in low position with wheels locked, side rails up x2, sitter at bedside  Report given to: Matthias Hays 36, RN  09/20/22 7405

## 2022-09-20 NOTE — ED NOTES
Pt is restless at [facility] time, states he is not feeling well, states he is having chills. RN updated provider about pt's demeanor at this time.       Ronel Santos RN  09/19/22 6190

## 2022-09-20 NOTE — ED NOTES
Pt resting on cot at this time. Pt ciwa is a 12 and was medicated per MAR. Pt respirations even and unlabored. [address], RN  09/20/22 9754

## 2022-09-20 NOTE — ED NOTES
Pt is resting in cot with eyes closed, respirations even and unlabored. No distress noted. Continuous video monitoring remains in place to ensure pt safety.       Pearl Kiser RN  09/20/22 4352

## 2022-09-20 NOTE — ED NOTES
RN called to room by the sitter Sameer Mejía due to the patient becoming restless. Pt states he wants to go for a walk, he is feeling closed in and irritated. This RN determined that the patient was stable on his feet and will allow the patient to move around with the assistance of Sameer Mejía.       Estefani Sheffield RN  09/20/22 4789

## 2022-09-20 NOTE — ED NOTES
RN called to bedside due to patient becoming agitated. [place] police at bedside. Pt refuses to keep vital sign monitoring on self. Pt states that he wants to leave.       Sb Badillo RN  09/20/22 7690

## 2022-09-20 NOTE — ED NOTES
Pt transported to [facility]. Floor called prior to transport, spoke with veronica. [place] [employer] called for escort.      Richie Fernández  09/20/22 1558

## 2022-09-20 NOTE — ED NOTES
Pt appears to be resting on cot. Sitter at bedside.  Call light in reach     Stan Powers RN  09/20/22 5705

## 2022-09-21 PROBLEM — E44.0 MODERATE MALNUTRITION (HCC): Status: ACTIVE | Noted: 2022-09-21

## 2022-09-21 LAB
EKG ATRIAL RATE: 51 BPM
EKG P AXIS: 38 DEGREES
EKG P-R INTERVAL: 144 MS
EKG Q-T INTERVAL: 434 MS
EKG QRS DURATION: 90 MS
EKG QTC CALCULATION (BAZETT): 400 MS
EKG R AXIS: 72 DEGREES
EKG T AXIS: 66 DEGREES
EKG VENTRICULAR RATE: 51 BPM

## 2022-09-21 PROCEDURE — 92610 EVALUATE SWALLOWING FUNCTION: CPT

## 2022-09-21 PROCEDURE — 2060000000 HC ICU INTERMEDIATE R&B

## 2022-09-21 PROCEDURE — 51798 US URINE CAPACITY MEASURE: CPT

## 2022-09-21 PROCEDURE — 6370000000 HC RX 637 (ALT 250 FOR IP): Performed by: STUDENT IN AN ORGANIZED HEALTH CARE EDUCATION/TRAINING PROGRAM

## 2022-09-21 PROCEDURE — 6370000000 HC RX 637 (ALT 250 FOR IP): Performed by: PEDIATRICS

## 2022-09-21 PROCEDURE — 6370000000 HC RX 637 (ALT 250 FOR IP): Performed by: PHYSICIAN ASSISTANT

## 2022-09-21 PROCEDURE — 93010 ELECTROCARDIOGRAM REPORT: CPT | Performed by: INTERNAL MEDICINE

## 2022-09-21 PROCEDURE — 2580000003 HC RX 258: Performed by: PEDIATRICS

## 2022-09-21 PROCEDURE — 92526 ORAL FUNCTION THERAPY: CPT

## 2022-09-21 PROCEDURE — 6360000002 HC RX W HCPCS: Performed by: PEDIATRICS

## 2022-09-21 PROCEDURE — 99232 SBSQ HOSP IP/OBS MODERATE 35: CPT | Performed by: STUDENT IN AN ORGANIZED HEALTH CARE EDUCATION/TRAINING PROGRAM

## 2022-09-21 RX ORDER — NICOTINE 21 MG/24HR
1 PATCH, TRANSDERMAL 24 HOURS TRANSDERMAL DAILY
Status: DISCONTINUED | OUTPATIENT
Start: 2022-09-21 | End: 2022-09-22 | Stop reason: HOSPADM

## 2022-09-21 RX ADMIN — PHENOBARBITAL 32.4 MG: 32.4 TABLET ORAL at 20:28

## 2022-09-21 RX ADMIN — SODIUM CHLORIDE: 9 INJECTION, SOLUTION INTRAVENOUS at 01:23

## 2022-09-21 RX ADMIN — OXYCODONE AND ACETAMINOPHEN 1 TABLET: 5; 325 TABLET ORAL at 20:27

## 2022-09-21 RX ADMIN — ENOXAPARIN SODIUM 40 MG: 100 INJECTION SUBCUTANEOUS at 08:25

## 2022-09-21 RX ADMIN — PHENOBARBITAL 64.8 MG: 32.4 TABLET ORAL at 01:21

## 2022-09-21 RX ADMIN — FOLIC ACID 1 MG: 1 TABLET ORAL at 08:27

## 2022-09-21 RX ADMIN — PHENOBARBITAL 64.8 MG: 32.4 TABLET ORAL at 08:27

## 2022-09-21 RX ADMIN — Medication 100 MG: at 08:27

## 2022-09-21 RX ADMIN — OXYCODONE AND ACETAMINOPHEN 1 TABLET: 5; 325 TABLET ORAL at 08:26

## 2022-09-21 RX ADMIN — Medication 1 TABLET: at 08:27

## 2022-09-21 ASSESSMENT — PAIN DESCRIPTION - DESCRIPTORS
DESCRIPTORS: ACHING

## 2022-09-21 ASSESSMENT — PAIN DESCRIPTION - LOCATION
LOCATION: BACK

## 2022-09-21 ASSESSMENT — PAIN SCALES - GENERAL
PAINLEVEL_OUTOF10: 6
PAINLEVEL_OUTOF10: 10
PAINLEVEL_OUTOF10: 10
PAINLEVEL_OUTOF10: 6
PAINLEVEL_OUTOF10: 10
PAINLEVEL_OUTOF10: 6
PAINLEVEL_OUTOF10: 10

## 2022-09-21 ASSESSMENT — PAIN DESCRIPTION - ORIENTATION
ORIENTATION: LOWER
ORIENTATION: LOWER
ORIENTATION: RIGHT;LEFT;MID

## 2022-09-21 ASSESSMENT — PAIN - FUNCTIONAL ASSESSMENT
PAIN_FUNCTIONAL_ASSESSMENT: ACTIVITIES ARE NOT PREVENTED
PAIN_FUNCTIONAL_ASSESSMENT: ACTIVITIES ARE NOT PREVENTED
PAIN_FUNCTIONAL_ASSESSMENT: PREVENTS OR INTERFERES SOME ACTIVE ACTIVITIES AND ADLS

## 2022-09-21 ASSESSMENT — PAIN DESCRIPTION - PAIN TYPE: TYPE: CHRONIC PAIN

## 2022-09-21 NOTE — CARE COORDINATION
9/21/22, 6:55 AM EDT  DISCHARGE PLANNING EVALUATION    SW consult deferred. Addiction services following.

## 2022-09-21 NOTE — PROGRESS NOTES
Utilize [facility] alcohol withdrawal scale (Based on [place] Modified Alcohol Withdrawal Scale). Tabulate score based on classifications including Tremor, Sweating, Hallucination, Orientation, and Agitation. Tremor: 1  Sweatin  Hallucinations: 0  Orientation: 0  Agitation: 1  Total Score: 3  Action perform as described below      Tremor:  No tremor is 0 points. Tremor on movement is 1 point. Tremor at rest is 2 points. Sweating: No Sweat 0 points. Moist is 1 point. Drenching sweats is 2 points. Hallucinations: No present 0 points. Dissuadable is 1 point. Not dissuadable is 2 points. Orientation: Oriented 0 points. Vague/detached 1 point. Disoriented/no contact 2 points. Agitation: Calm 0 points. Anxious 1 point. Panicky 2 points.

## 2022-09-21 NOTE — PROGRESS NOTES
Reinforced suicide precautions with patient. Reinforced that visitors will be screened and may be limited at the discretion of the nurse. Visitor belongings are subject to be searched. Belongings may not be allowed into the patient room. Reviewed any personal belongings from the previous shift believed to be a threat to patient safety removed from room. Belongings removed include:  personal items   Placed in secure area. Room screened for safety, items removed to create a safe environment include:   Blood pressure cuff   Loose or extra cords, tubing (not of medical necessity)   Extra Linens   Telephone   Toiletries   Trash Liners   Patient's cell phone and charging cord (must be removed from room)      Safety tray ordered: yes    Expectations were discussed with sitter (unit support aide). Sitter positioned near exit. Sitter reminded that patient should be observed at all times including toileting and bathing. Sitter has security radio and documentation sheet. Patient and sitter included in hourly rounds.

## 2022-09-21 NOTE — PROGRESS NOTES
Comprehensive Nutrition Assessment    Type and Reason for Visit:  Initial, Positive Nutrition Screen (unplanned wt loss, poor po /appetite)    Nutrition Recommendations/Plan:   Diet per SLP. SLP swallow eval pending. Send Ensure Enlive TID. Continue MVI, Folvite, Thiamine. Malnutrition Assessment:  Malnutrition Status: Moderate malnutrition (09/21/22 1407)    Context:  Chronic Illness     Findings of the 6 clinical characteristics of malnutrition:  Energy Intake:  75% or less estimated energy requirements for 1 month or longer  Weight Loss:   (pt reports 16.2% wt loss in ~ 6 weeks , unable to verify)     Body Fat Loss:  Mild body fat loss Orbital, Fat Overlying Ribs   Muscle Mass Loss:  Mild muscle mass loss Temples (temporalis), Clavicles (pectoralis & deltoids)  Fluid Accumulation:  No significant fluid accumulation     Strength:  Not Performed    Nutrition Assessment:     Pt. moderately malnourished AEB criteria as listed above. At risk for further nutrition compromise r/t admit with suicidal ideation, alcohol abuse with Hx of withdrawal, tobacco abuse,  and underlying medical condition (hx maxillofacial /throat cancer follows ENT in [facility], pt failed to show up for followup appointment or staging. ). Nutrition Related Findings:    Pt. Report/Treatments/Miscellaneous: Pt seen, appears thin. He mentions he is tired of eating full liquid diet & wants diet advanced however nurse tech /sitter mentions waiting on SLP to eval swallow test to insure swallowing safety. Pt mentions he feels discomfort on left side of his throat when he swallows solid food. He repots he can still swallow food it just feels irritating.  Per diet hx, pt reports he was running out of food at home & tended to consume 1/5 or pint of alcohol/day (it was noted by physician 9/21  pt consuming 2/5 alcohol or whiskey daily on purpose with intention to end his life) Pt mentions prior to throat cancer Dx  he would consume 8 beers on Saturdays only however he started increasing his alcohol intake whenhe was diagnosed with throat cancer. Pt lives alone & reports he plans on living with his parents after discharge  . GI Status: BM x 1 (9/21)  Pertinent Labs: labs reviewed  Pertinent Meds: folvite, MVI, thiamine, zofran     Wound Type: None       Current Nutrition Intake & Therapies:    Average Meal Intake: % (soup & pudding ( on full liquids& tired of them))  Average Supplements Intake:  (new)  ADULT DIET; Full Liquid; Safety Tray; Safety Tray (Disposables)  ADULT ORAL NUTRITION SUPPLEMENT; Breakfast, Lunch, Dinner; Standard High Calorie/High Protein Oral Supplement    Anthropometric Measures:  Height: 6' 1\" (185.4 cm)  Ideal Body Weight (IBW): 184 lbs (84 kg)    Admission Body Weight: 155 lb 9.6 oz (70.6 kg) ((9/20) no edema stand scale)  Current Body Weight: 155 lb 9.6 oz (70.6 kg) ((9/20) stand scale no edema),   IBW. Current BMI (kg/m2): 20.5  Usual Body Weight:  (no previous wts in EMR, pt reports he weighed 185# ~6 weeks PTA)                       BMI Categories: Normal Weight (BMI 18.5-24. 9)    Estimated Daily Nutrient Needs:  Energy Requirements Based On: Kcal/kg     Energy (kcal/day): ~6135-3436 kcals (30-35 kcals/kgm)     Protein (g/day): 106 grams (1.2-1.5 grams protein/kgm) maxillofacial/throat cancer       Nutrition Diagnosis:   Moderate malnutrition related to inadequate protein-energy intake as evidenced by Criteria as identified in malnutrition assessment    Nutrition Interventions:   Food and/or Nutrient Delivery: Continue Current Diet, Start Oral Nutrition Supplement  Nutrition Education/Counseling: Education initiated (Encouraged pt not to skip meals. Encouraged pt to consume ONS TID best effort.  Encouraged Vitmain C foods on each tray ( fruit & vegetable servings))  Coordination of Nutrition Care: Continue to monitor while inpatient, Speech Therapy, Swallow Evaluation       Goals:     Goals: PO intake 75% or greater, by next RD assessment       Nutrition Monitoring and Evaluation:   Behavioral-Environmental Outcomes: Other (Comment) (psych consult pending)  Food/Nutrient Intake Outcomes: Diet Advancement/Tolerance, Food and Nutrient Intake, Supplement Intake  Physical Signs/Symptoms Outcomes: Biochemical Data, Chewing or Swallowing, GI Status, Fluid Status or Edema, Hemodynamic Status, Nutrition Focused Physical Findings, Skin, Weight    Discharge Planning:     Too soon to determine     Avelino Keenan RD, LD  Contact: [phone]

## 2022-09-21 NOTE — CARE COORDINATION
9/21/22, 9:01 AM EDT  DISCHARGE PLANNING EVALUATION:    Kaden Keating       Admitted: 9/19/2022/ Elisa 4 day: 2   Location: -06/006-A Reason for admit: Suicidal ideation [R45.851]  Alcohol withdrawal syndrome, uncomplicated (HCC) [M44.039]  Alcohol withdrawal syndrome without complication ([facility] [address] [F80.608]   PMH:  has no past medical history on file. Barriers to Discharge:    Alcohol Withdrawal/Suicidal Ideation  PCP: No primary care provider on file. Will need set up on 4E if planned  Readmission Risk Score: 4.6%  Patient's Healthcare Decision Maker: Legal Next of Kin  Patient Goals/Plan/Treatment Preferences: lives alone; Addiction Services/Psychiatry following; monitor possible 4E  Transportation/Food Security/Housekeeping Addressed:  No issues identified.

## 2022-09-21 NOTE — PLAN OF CARE
Problem: Self Harm/Suicidality  Goal: Will have no self-injury during hospital stay  Description: INTERVENTIONS:  1. Q 30 MINUTES: Routine safety checks  2. Q SHIFT & PRN: Assess risk to determine if routine checks are adequate to maintain patient safety  Outcome: Progressing     Pt has 1:1 sitter at bedside. Pt has been sleeping throughout shift and anxious when awake. No self harm attempts or injury thus far. Will continue to monitor.

## 2022-09-21 NOTE — PROGRESS NOTES
Hospitalist Progress Note      Patient:  Minerva Burris    Unit/Bed:4K-06/006-A  YOB: 1980  MRN: [id number]   Acct: [de-identified]   PCP: No primary care provider on file. Date of Admission: 9/19/2022    Assessment/Plan:    Suicidal ideation  Expressed suicide intent due to alcohol use upon presentation to the ED  Pending Psychiatry evaluation  [facility] on arrival  Suicide precautions with sitter 1:1  Alcohol abuse with history of withdrawal  Currently on phenobarbital protocol. Will complete his last scheduled dose late tomorrow evening. Continue MTV, folic acid, thiamine  Addiction services consulted  Hx maxillofacial/throat Ca  Follows with ENT in [facility], but will likely miss his appointment due to acute hospitalization  Currently on clear liquid diet. SLP consulted. If no dysphagia, will advance to regular diet  Tobacco use  Nicotine patch ordered. Disposition: Pending psychiatry evaluation. Will need inpatient alcohol withdrawal treatment for until Friday morning. Chief Complaint: Alcohol withdrawal.    Hospital Course:    Initial H&P \"Damaso Agrawal is a 43 y.o. male with a past medical history that is only significant for heavy alcohol consumption who presents to [address] [place] [address] with at least a 2-week history of binge drinking of alcohol consuming about two fifths of alcohol or whiskey daily on purpose with intention to end his life through drinking he says. Patient has recently been diagnosed with a form of maxillofacial or throat cancer and failed to show up to his follow-up appointments for staging. He has been having increased pain in his jaw he says that was unbearable and therefore he decided to end his life through drinking. Patient also reported to nursing staff in triage that he had thoughts about shooting himself.   Patient does have access to firearms in his parents store but does not have any firearms in his own place. Denies any prior history of suicide attempts or any inpatient psychiatric hospitalization for suicide. Denies any homicidal ideation as well. Patient also reports having withdrawal seizures at least 2 times at home within the last 24 hours with the last one being just before coming in where he was laying on the floor and able to hear the voices of the neighbors kids but unable to get up by himself for a while. He finally decided to voluntarily come in for treatment. ER course: In the emergency room patient had disclosed to his plans to end his life by shooting himself or drinking himself to death and a level a was paged and patient was placed in the safe room that is ligature resisted with continuous monitoring. Vital signs were stable and he was afebrile. Laboratory investigations were pretty unremarkable. Except for an alcohol level of 0.32 mildly elevated glucose of 139. His urine drug screen was negative. Mild leukocytosis of 11.3. Urinalysis was essentially negative. Twelve-lead EKG was showing normal sinus rhythm with no acute ischemic changes. Patient was given midazolam 5 mg intramuscularly x2, multivitamins 1 tablet x 1, folic acid 1 mg x 1 and thiamine 100 mg p.o. x1 and started on IV fluids normal saline at 100 cc an hour. Patient was then admitted to our hospital service for further evaluation and treatment and psychiatric evaluation. At the time of evaluation in the emergency room, patient was laying in bed shaking and stating that he is cold. He was able to answer all questions appropriately and participate in all aspects of history and physical exam.  Did not look in any acute distress although he does have tremors. \"        Subjective (past 24 hours):   Doing well this morning. Denies any suicidal ideation or plans. Having mild anxiety related to alcohol withdrawal.  Denies hallucinations. No seizure like activity.         Past medical history, family history, social history and allergies reviewed again and is unchanged since admission. ROS (12 point review of systems completed. Pertinent positives noted. Otherwise ROS is negative)     Medications:  Reviewed    Infusion Medications    sodium chloride      sodium chloride 100 mL/hr at 09/21/22 0608     Scheduled Medications    sodium chloride flush  5-40 mL IntraVENous 2 times per day    thiamine  100 mg Oral Daily    enoxaparin  40 mg SubCUTAneous Daily    multivitamin  1 tablet Oral Daily    folic acid  1 mg Oral Daily    PHENobarbital  32.4 mg Oral BID     PRN Meds: sodium chloride flush, sodium chloride, ondansetron **OR** ondansetron, polyethylene glycol, potassium chloride **OR** potassium alternative oral replacement **OR** potassium chloride, magnesium sulfate, aluminum & magnesium hydroxide-simethicone, ketorolac, oxyCODONE-acetaminophen      Intake/Output Summary (Last 24 hours) at 9/21/2022 1244  Last data filed at 9/21/2022 7135  Gross per 24 hour   Intake 3084.48 ml   Output --   Net 3084.48 ml       Diet:  ADULT DIET; Full Liquid; Safety Tray; Safety Tray (Disposables)    Exam:  BP (!) 149/96   Pulse 57   Temp 98.4 °F (36.9 °C) (Oral)   Resp 18   Ht 6' 1\" (1.854 m)   Wt 155 lb 9.6 oz (70.6 kg)   SpO2 100%   BMI 20.53 kg/m²   General appearance: No apparent distress, appears stated age and cooperative. HEENT: Pupils equal, round, and reactive to light. Conjunctivae/corneas clear. Left sided cervical and pre-auricular adenopathy noted. Neck: Supple, with full range of motion. No jugular venous distention. Trachea midline. Respiratory:  Normal respiratory effort. Clear to auscultation, bilaterally without Rales/Wheezes/Rhonchi. Cardiovascular: Regular rate and rhythm with normal S1/S2 without murmurs, rubs or gallops. Abdomen: Soft, non-tender, non-distended with normal bowel sounds. Musculoskeletal: passive and active ROM x 4 extremities.   Skin: Skin color, texture, turgor normal. No rashes or lesions. Neurologic:  Neurovascularly intact without any focal sensory/motor deficits. Cranial nerves: II-XII intact, grossly non-focal.  No tremors. Psychiatric: Alert and oriented, thought content appropriate, normal insight  Capillary Refill: Brisk,< 3 seconds   Peripheral Pulses: +2 palpable, equal bilaterally     Labs:   Recent Labs     09/19/22  1446 09/20/22  0732   WBC 11.5* 6.9   HGB 15.6 14.4   HCT 45.7 42.8    156     Recent Labs     09/19/22  1446 09/20/22  0732    135   K 3.9 4.2    102   CO2 23 26   BUN 16 18   CREATININE 0.6 0.6   CALCIUM 8.3* 8.5     Recent Labs     09/19/22  1446   AST 32   ALT 20   BILITOT 0.6   ALKPHOS 109     No results for input(s): INR in the last 72 hours. No results for input(s): Johanna Fuchs in the last 72 hours. Microbiology:    Blood culture #1: No results found for: BC    Blood culture #2:No results found for: Jessica Denis    Organism:No results found for: ORG    No results found for: LABGRAM    MRSA culture only:No results found for: [address] [place] [address]    Urine culture: No results found for: LABURIN    Respiratory culture: No results found for: CULTRESP    Aerobic and Anaerobic :  No results found for: LABAERO  No results found for: LABANAE    Urinalysis:      Lab Results   Component Value Date/Time    NITRU NEGATIVE 09/19/2022 02:20 PM    [address] 2-4 09/19/2022 02:20 PM    BACTERIA NONE SEEN 09/19/2022 02:20 PM    RBCUA 3-5 09/19/2022 02:20 PM    BLOODU NEGATIVE 09/19/2022 02:20 PM    GLUCOSEU NEGATIVE 09/19/2022 02:20 PM       Radiology:  No orders to display     No results found.     Electronically signed by Kiarra Myles DO on 9/21/2022 at 12:44 PM

## 2022-09-21 NOTE — PLAN OF CARE
Problem: Self Harm/Suicidality  Goal: Will have no self-injury during hospital stay  Description: INTERVENTIONS:  1. Q 30 MINUTES: Routine safety checks  2. Q SHIFT & PRN: Assess risk to determine if routine checks are adequate to maintain patient safety  9/21/2022 0945 by Mandeep Escobedo RN  Outcome: Progressing  Note: Live sitter at bedside. Patient states no desire to inflict harm upon self. Outcome: Progressing     Problem: Discharge Planning  Goal: Discharge to home or other facility with appropriate resources  Outcome: Progressing  Flowsheets (Taken 9/20/2022 1707 by Yulissa Escobedo RN)  Discharge to home or other facility with appropriate resources:   Identify barriers to discharge with patient and caregiver   Identify discharge learning needs (meds, wound care, etc)   Refer to discharge planning if patient needs post-hospital services based on physician order or complex needs related to functional status, cognitive ability or social support system   Arrange for needed discharge resources and transportation as appropriate     Problem: Pain  Goal: Verbalizes/displays adequate comfort level or baseline comfort level  Outcome: Progressing  Flowsheets (Taken 9/21/2022 0945)  Verbalizes/displays adequate comfort level or baseline comfort level:   Encourage patient to monitor pain and request assistance   Administer analgesics based on type and severity of pain and evaluate response   Assess pain using appropriate pain scale   Implement non-pharmacological measures as appropriate and evaluate response     Problem: Safety - Adult  Goal: Free from fall injury  Outcome: Progressing  Flowsheets (Taken 9/21/2022 0943)  Free From Fall Injury:   Instruct family/caregiver on patient safety   Based on caregiver fall risk screen, instruct family/caregiver to ask for assistance with transferring infant if caregiver noted to have fall risk factors   Care plan reviewed with patient.   Patient verbalizes understanding of the plan of care and contributes to goal setting.

## 2022-09-21 NOTE — PROGRESS NOTES
Attempted to complete addiction consult, patient with staff at time of attempt. GALILEA to re-attempt.

## 2022-09-21 NOTE — PROGRESS NOTES
[address] ICU STEPDOWN TELEMETRY 4K  Clinical Swallow Evaluation      SLP Individual Minutes  Time In: 1520  Time Out: 6249  Minutes: 22  Timed Code Treatment Minutes: 0 Minutes       Date: 2022  Patient Name: Mandy Grider      CSN: [id number]   : 1980  (43 y.o.)  Gender: male   Referring Physician:  Rubina Small DO    Diagnosis: Alcohol withdrawal syndrome, uncomplicated ([facility] [address]    History of Present Illness/Injury: Per chart review; Mandy Grider  is a 44 y/o male who was admitted to [facility] with the above medical dx. Nicole Arguello is a 43 y.o. male with a past medical history that is only significant for heavy alcohol consumption who presents to [address] [place] [address] with at least a 2-week history of binge drinking of alcohol consuming about two fifths of alcohol or whiskey daily on purpose with intention to end his life through drinking he says. Patient has recently been diagnosed with a form of maxillofacial or throat cancer and failed to show up to his follow-up appointments for staging. He has been having increased pain in his jaw he says that was unbearable and therefore he decided to end his life through drinking. Patient also reported to nursing staff in triage that he had thoughts about shooting himself. Patient does have access to firearms in his parents store but does not have any firearms in his own place. Denies any prior history of suicide attempts or any inpatient psychiatric hospitalization for suicide. Denies any homicidal ideation as well. Patient also reports having withdrawal seizures at least 2 times at home within the last 24 hours with the last one being just before coming in where he was laying on the floor and able to hear the voices of the neighbors kids but unable to get up by himself for a while. He finally decided to voluntarily come in for treatment. \"  No past medical history on file.     SUBJECTIVE:  Patient seen upright in bed with RN permission. PT with live sitter at the bedside. No family present on this date. OBJECTIVE:    Pain:  No pain reported. Current Diet: Clear liquids     Respiratory Status:  Room Air    Behavioral Observation:  Alert, Oriented, and Agitated    CRANIAL NERVE ASSESSMENT   CN V (Trigeminal) Closes and Opens Mandible WFL    Rotary Jaw Movement WFL      CN VII (Facial) Cheeks Hold Food out of Sulci WFL    Opens, Closes/Seals, Protrudes, Retracts Lips WFL    General Appearance WFL    Sensation WFL      CN X (Vagus - Pharyngeal) Raises Back of Tongue WFL      CN XI (Accessory) Lifts Soft Palate WFL      CN XII (Hypoglossal) Elevates Tongue Up and Back WFL    Protrusion   WFL    Lateralizes Tongue WFL    Sensation Not Tested      Other Observations Dentition Natural dentition    Vocal Quality WFL    Cough DNT      Patient Evaluated Using: Thin Liquids, Puree, Soft Solids, and Coarse Solids    Oral Phase:  WFL    Pharyngeal Phase: WFL:  Pharyngeal phase appears WFL but cannot rule out pharyngeal phase deficits from a bedside swallowing evaluation alone. Signs and Symptoms of Laryngeal Penetration/Aspiration: No signs/symptoms of aspiration evident in this evaluation, but cannot rule out silent aspiration. Impressions: [facility]phag[facility] with inability to fully discern potential presence of pharyngeal phase deficits without formal instrumentation. Oral and pharyngeal phases of the swallow remain highly unremarkable across all PO trials this date. Pt denied any hx of dysphagia or skilled ST despite his recent throat cancer dx, HOWEVER, did endorse completion of chemo/radiation. Patient demonstrated very poor insight re: dysphagia and head/neck cancer correlation. Recommended diet upgrade to regular solids, thin liquids with continued skilled ST for dysphagia management and education.      **Education: Although swallow function appears highly unremarkable on this date, ST completed education re: the pt's increased risk of airway invasion, overt s/s of airway invasion if swallow function declines, and basic outline and correlation of the progression of throat cancer with the anatomy and physiology of swallow function as well as, how radiation treatment can result in fibrotic neck tissue, which can limit pharyngeal ROM and strength. Patient slightly defensive at the start of education, however, was receptive with ST reinforcement and explanation. Given recent suicidal ideations, ST withheld conversations re: possible swallow function decline. RECOMMENDATIONS/ASSESSMENT:  Instrumental Evaluation: Instrumental evaluation not indicated at this time. Diet Recommendations:  regular solids, thin liquids   Strategies:  Full Upright Position   Rehabilitation Potential: good  Discharge Recommendations: Continue to Assess Pending Progress    EDUCATION:  Learner: Patient  Education:  Reviewed results and recommendations of this evaluation, Reviewed diet and strategies, Reviewed ST goals and Plan of Care, and Reviewed recommendations for follow-up  Evaluation of Education: Verbalizes understanding    PLAN:  Skilled SLP intervention on acute care 3-5 x per week or until goals met and/or pt plateaus in function. Specific interventions for next session may include: advanced dietary analysis and education re: throat cancer/dysphagia correlation as clinically indicated. Jenifer Fragoso PATIENT GOAL:    Return to least restrictive diet. SHORT TERM GOALS:  Short-term Goals  Timeframe for Short-term Goals: 2 weeks  Goal 1: Patient will safely consume regular solids, thin liquids with no overt s/s of airway invasion to assist in nutrition/hydrational needs. Goal 2: Patient will demonstrate understanding of continued skilled ST given HNC dx with chemo/radiation treatment and the potential risk of dysphagia    LONG TERM GOALS:  No LTM Goals recommended, due to anticipated short ELOS. [place] Caryle Lanius MA., Francisco Semen / SP#.[phone]

## 2022-09-22 VITALS
BODY MASS INDEX: 20.6 KG/M2 | DIASTOLIC BLOOD PRESSURE: 95 MMHG | HEART RATE: 63 BPM | OXYGEN SATURATION: 98 % | TEMPERATURE: 98.2 F | HEIGHT: 73 IN | RESPIRATION RATE: 18 BRPM | WEIGHT: 155.42 LBS | SYSTOLIC BLOOD PRESSURE: 152 MMHG

## 2022-09-22 PROCEDURE — 2580000003 HC RX 258: Performed by: PEDIATRICS

## 2022-09-22 PROCEDURE — 6370000000 HC RX 637 (ALT 250 FOR IP): Performed by: PEDIATRICS

## 2022-09-22 PROCEDURE — 6360000002 HC RX W HCPCS: Performed by: PEDIATRICS

## 2022-09-22 PROCEDURE — 99232 SBSQ HOSP IP/OBS MODERATE 35: CPT | Performed by: STUDENT IN AN ORGANIZED HEALTH CARE EDUCATION/TRAINING PROGRAM

## 2022-09-22 PROCEDURE — 6370000000 HC RX 637 (ALT 250 FOR IP): Performed by: STUDENT IN AN ORGANIZED HEALTH CARE EDUCATION/TRAINING PROGRAM

## 2022-09-22 PROCEDURE — 6370000000 HC RX 637 (ALT 250 FOR IP): Performed by: PHYSICIAN ASSISTANT

## 2022-09-22 RX ADMIN — SODIUM CHLORIDE, PRESERVATIVE FREE 10 ML: 5 INJECTION INTRAVENOUS at 08:06

## 2022-09-22 RX ADMIN — FOLIC ACID 1 MG: 1 TABLET ORAL at 08:06

## 2022-09-22 RX ADMIN — KETOROLAC TROMETHAMINE 30 MG: 30 INJECTION, SOLUTION INTRAMUSCULAR; INTRAVENOUS at 02:26

## 2022-09-22 RX ADMIN — SODIUM CHLORIDE: 9 INJECTION, SOLUTION INTRAVENOUS at 01:09

## 2022-09-22 RX ADMIN — PHENOBARBITAL 32.4 MG: 32.4 TABLET ORAL at 08:06

## 2022-09-22 RX ADMIN — Medication 1 TABLET: at 08:06

## 2022-09-22 RX ADMIN — Medication 100 MG: at 08:05

## 2022-09-22 RX ADMIN — OXYCODONE AND ACETAMINOPHEN 1 TABLET: 5; 325 TABLET ORAL at 05:57

## 2022-09-22 ASSESSMENT — LIFESTYLE VARIABLES
HOW OFTEN DO YOU HAVE A DRINK CONTAINING ALCOHOL: 4 OR MORE TIMES A WEEK
HOW MANY STANDARD DRINKS CONTAINING ALCOHOL DO YOU HAVE ON A TYPICAL DAY: 10 OR MORE

## 2022-09-22 ASSESSMENT — PAIN SCALES - GENERAL
PAINLEVEL_OUTOF10: 5
PAINLEVEL_OUTOF10: 5
PAINLEVEL_OUTOF10: 10

## 2022-09-22 ASSESSMENT — PATIENT HEALTH QUESTIONNAIRE - PHQ9: SUM OF ALL RESPONSES TO PHQ QUESTIONS 1-9: 22

## 2022-09-22 ASSESSMENT — PAIN - FUNCTIONAL ASSESSMENT: PAIN_FUNCTIONAL_ASSESSMENT: ACTIVITIES ARE NOT PREVENTED

## 2022-09-22 ASSESSMENT — PAIN DESCRIPTION - LOCATION: LOCATION: ABDOMEN;BUTTOCKS

## 2022-09-22 ASSESSMENT — PAIN DESCRIPTION - ORIENTATION: ORIENTATION: RIGHT;LEFT;MID

## 2022-09-22 NOTE — CARE COORDINATION
Collaborative Discharge Planning    Bay Gibson  :  1980  MRN:  [id number]    ADMIT DATE:  2022      Discharge Planning Discharge Planning  Type of Residence: House  Living Arrangements: Alone  Support Systems: Family Members, Friends/Neighbors  Current Services Prior To Admission: None  Potential Assistance Needed: N/A  DME Ordered?: No  Type of Home Care Services: None  Patient expects to be discharged to[de-identified] House  History of falls?: Yes  White Board Notes /Social Work Whiteboard Notes  /Social Work Whiteboard:  CM; lives alone; monitor possible 4E; await Psychiatry recommendations    Discharge Plan Psych  lives alone;  Addiction Services/Psychiatry following; monitor possible 4E  Discharge Milestones and Delays: Clinical status  Alcohol Withdrawal/Suicidal Ideation  ETOH 0.32  Phenobarbital per SR Alcohol Withdrawal Scale  Await Psychiatry recommendations  Client threatening AMA per report; monitor  SIGNED:  Calli Copeland RN   2022, 12:18 PM

## 2022-09-22 NOTE — CONSULTS
Brief Intervention and Referral to Treatment Summary    Patient was provided PHQ-9, AUDIT-C and DAST Screening:      PHQ-9 Score: 22  AUDIT-C Score:  12  DAST Score:  0    Patients substance use is considered     Dependent    Patients depression is considered:     Severe    Brief Education Was Provided    Patient was receptive    Brief Intervention Is Provided (Only for AUDIT-C or DAST)     Patient reports readiness to decrease and/or stop use and a plan was discussed      Recommendations/Referrals for Brief and/or Specialized Treatment Provided to Patient     Pt is from [place]. Resources provided. Educated pt on [address] in [place]. Pt very receptive.

## 2022-09-22 NOTE — PROGRESS NOTES
Patient left AMA, EMC was up at 15:10 pm, patients mother at bedside. Patient aware of AMA and primary physician updated. All lines removed prior to leaving. Patient verbalized understanding of AMA meaning.

## 2022-09-22 NOTE — PROGRESS NOTES
Pt , a 43year old male was with a sitter and was about to be released at the time of the visit. He said that his mother was coming to pick him up soon. He was dealing with alcohol withdrawal syndrome. He was hoping to be well and wanted prayer for healing.  He got it.    09/22/22 1421   Encounter Summary   Encounter Overview/Reason  Initial Encounter   Service Provided For: Patient   Referral/Consult From: [facility] Parent   Last Encounter  09/22/22   Complexity of Encounter Low   Begin Time 1402   End Time  1406   Total Time Calculated 4 min   Spiritual/Emotional needs   Type Spiritual Support   Assessment/Intervention/Outcome   Assessment Calm   Intervention Active listening;Empowerment

## 2022-09-22 NOTE — PLAN OF CARE
Problem: Self Harm/Suicidality  Goal: Will have no self-injury during hospital stay  Description: INTERVENTIONS:  1. Q 30 MINUTES: Routine safety checks  2. Q SHIFT & PRN: Assess risk to determine if routine checks are adequate to maintain patient safety  Outcome: Progressing     Problem: Discharge Planning  Goal: Discharge to home or other facility with appropriate resources  Outcome: Progressing  Flowsheets (Taken 9/21/2022 2030)  Discharge to home or other facility with appropriate resources:   Identify barriers to discharge with patient and caregiver   Arrange for needed discharge resources and transportation as appropriate   Identify discharge learning needs (meds, wound care, etc)   Refer to discharge planning if patient needs post-hospital services based on physician order or complex needs related to functional status, cognitive ability or social support system     Problem: Pain  Goal: Verbalizes/displays adequate comfort level or baseline comfort level  Outcome: Progressing  Flowsheets (Taken 9/21/2022 0945 by Phu Guillaume RN)  Verbalizes/displays adequate comfort level or baseline comfort level:   Encourage patient to monitor pain and request assistance   Administer analgesics based on type and severity of pain and evaluate response   Assess pain using appropriate pain scale   Implement non-pharmacological measures as appropriate and evaluate response     Problem: Safety - Adult  Goal: Free from fall injury  Outcome: Progressing  Flowsheets (Taken 9/21/2022 0943 by Phu Guillaume RN)  Free From Fall Injury:   Instruct family/caregiver on patient safety   Based on caregiver fall risk screen, instruct family/caregiver to ask for assistance with transferring infant if caregiver noted to have fall risk factors     Problem: Nutrition Deficit:  Goal: Optimize nutritional status  9/22/2022 0151 by Daniel Sinclair RN  Outcome: Progressing  Flowsheets (Taken 9/21/2022 1425 by Kasia Ramirez RD, LD)  Nutrient intake appropriate for improving, restoring, or maintaining nutritional needs:   Assess nutritional status and recommend course of action   Monitor oral intake, labs, and treatment plans   Recommend appropriate diets, oral nutritional supplements, and vitamin/mineral supplements  9/21/2022 1425 by Naty Arora RD, LD  Flowsheets (Taken 9/21/2022 1425)  Nutrient intake appropriate for improving, restoring, or maintaining nutritional needs:   Assess nutritional status and recommend course of action   Monitor oral intake, labs, and treatment plans   Recommend appropriate diets, oral nutritional supplements, and vitamin/mineral supplements  9/21/2022 1424 by Naty Arora RD, LD  Flowsheets (Taken 9/21/2022 1424)  Nutrient intake appropriate for improving, restoring, or maintaining nutritional needs:   Assess nutritional status and recommend course of action   Monitor oral intake, labs, and treatment plans   Recommend appropriate diets, oral nutritional supplements, and vitamin/mineral supplements   Care plan reviewed with patient. Patient verbalizes understanding of the care plan and contributed to goal setting.

## 2022-09-22 NOTE — DISCHARGE SUMMARY
Hospitalist Discharge Summary    Patient left AMA and was not discharged. EMC was up prior to patient signing out AMA, but was reportedly cleared by psych of any SI/HI prior to leaving, pending official documentation. Patient had yet to complete his course of phenobarbital, but was not exhibiting symptoms of alcohol withdrawal as of this morning.

## 2022-09-23 ENCOUNTER — CARE COORDINATION (OUTPATIENT)
Dept: CASE MANAGEMENT | Age: 42
End: 2022-09-23

## 2022-09-23 NOTE — CONSULTS
[address] [id number]                                  CONSULTATION    PATIENT NAME: Anny Oconnor                     :        1980  MED REC NO:   [id number]                           ROOM:       0006  ACCOUNT NO:   [de-identified]                           ADMIT DATE: 2022  PROVIDER:     ALLYSON Allen DATE:  2022    CONSULT TO:  Joseph Soler MD    REASON FOR CONSULT:  Suicidal ideation, EtOH abuse and detox. SOURCES OF INFORMATION:  The patient and electronic records. IDENTIFYING INFORMATION:  The patient is a 77-year-old single   male. He has no children. He lives alone. He is on FMLA. HISTORY OF PRESENT ILLNESS:  The patient presents to [address] [place] [address] ED due to alcohol use. For about two weeks since leaving his  job, he has been drinking very heavily, either a pint or a fifth of  liquor daily. He says he used to drink on weekends. He decided to stop  working due to his medical issues. He says he was diagnosed with throat  cancer last March. He is supposed to have treatment, but he is  concerned about those treatments. He says he has lost a brother-in-law  and an uncle within the past six months with cancer and chemotherapy. He reports he feels down sometimes thinking about his diagnosis, but he  denies major depressive symptoms including suicidal thoughts although in  the emergency room he may have mentioned about suicidal thoughts, but he  was under the influence of alcohol. His alcohol level upon presentation  was 0.32. He says he has no history of mental illness and no history of  depression. He reports that his parents have guns, but they are locked  at his father's. Again, he denies suicidal thoughts. He denies  psychotic symptoms. No history of abuse or trauma.     PAST PSYCHIATRIC HISTORY:  Noncontributory other than he has been in  counseling for alcohol in the past.    FAMILY HISTORY:  His brother is an alcoholic. His father is a  recovering alcoholic. He has 11 aunts and uncles on his father's side. He believes all of them were alcoholics. He denies family history of  illicit drugs. No family history of suicide attempts. SOCIAL HISTORY:  The patient was born in [facility], raised in [place].   Parents are  and live in [place].  He has a full brother and a  full sister. He keeps in touch with his parents and his siblings, but  his mother is his primary support. He lives very close to his parents. He graduated from high school. He has a bachelor in business management  and human resources from [employer]. He also has his  master's in the same field from [employer]. He has never been  . He has been in different relationships. He says he may have  been in four serious relationships about five years, each one of them. He has no children. He is heterosexual.  He lives alone. He has worked  for three different factories, two of them as . 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 He went on [employer] three weeks ago. Longest time he had a  job was 14 years in one of those factories. He reports a history of  drinking alcohol, started when he was 15. He said he used to drink  beer. He started drinking liquor in his mid 35s. He says he used to  drink mainly on weekends or on his days off, but for the past two weeks,  he has been drinking a pint or a fifth of alcohol daily. He has  withdrawal symptoms from alcohol. He has experienced marijuana a few  times. He denies other illicit drug use. Urine tox screen is negative. He had one DUI in 2017. He believes in a higher power. MEDICAL AND SURGICAL HISTORY:  Recent diagnosis of cancer. MEDICATIONS:  Phenobarbital.    ALLERGIES:  NO KNOWN DRUG ALLERGIES.     MENTAL STATUS EXAMINATION:  The patient appears stated age, dressed in a  hospital gown. He has good eye contact. Poor grooming and fair  hygiene. He is cooperative with the interview. Speech clear, coherent,  and spontaneous. Mood euthymic and affect appropriate. He denies  suicidal or homicidal ideations. No psychosis. No mood swings. No  flight of ideas and no racing thoughts. Thought process is goal  oriented. He is alert and oriented x3. He has fair attention and  concentration. Memory appears to be intact as tested within the context  of the interview. Intelligence appears average. Judgment and insight  are limited. DIAGNOSES:  1. Adjustment disorder with depressed mood. 2.  Alcohol use disorder, severe. 3.  Rule out alcohol-induced mood disorder. 4.  Chronic alcohol use. ASSESSMENT:  The patient is a 55-year-old single  male. He  presented to [facility] due to alcohol use. While in the  emergency room, he mentioned about having suicidal thoughts, but he was  intoxicated with an alcohol level of 0.32. He was diagnosed with some  type of maxillofacial or throat cancer in March, but he is very leery  about starting treatment. Again, he denies having major depressive  symptoms including suicidal thoughts and he has no prior history of  depression or anxiety. PLAN:  1. Continue detox protocol per Dr. Jorge Grimaldo and his associates. 2.  Support and reassurance given. 3.  Discussed with the patient possible outpatient followup for  individual counseling. 4.  The patient can be discharged home per psychiatric standpoint. Thanks Dr. Jorge Grimaldo for allowing me to participate in the care of this  patient. Paige Bowman M.D.    D: 09/22/2022 18:01:04       T: 09/22/2022 21:05:36     [email or web address]  Job#: [id number]     Doc#: [id number]    CC:   Patrick Gaona Md

## 2022-09-23 NOTE — CARE COORDINATION
OPENED IN ERROR.     Sami Hughes, DL    [phone]  [facility] / [address] [id number] Coordinator

## 2022-09-26 ENCOUNTER — CARE COORDINATION (OUTPATIENT)
Dept: CASE MANAGEMENT | Age: 42
End: 2022-09-26

## 2022-09-26 NOTE — CARE COORDINATION
Care Transitions Outreach Attempt    Call within 2 business days of discharge: Yes   Attempted to reach patient for transitions of care follow up. Unable to reach patient. Patient: Jeannie Anderson Patient : 1980 MRN: [id number]    Last Discharge [facility]       Date Complaint Diagnosis Description Type Department Provider    22 Suicidal Suicidal ideation . .. ED to Hosp-Admission (Discharged) (ADMITTED) EWA 4K [facility]. ..          24 Hour Transition of Care Call:    2nd Attempt to contact patient for 24 Hour Transition Call - (Discharged from Hospital to Home)  Patient was not reached. No answer - Unable to leave message. FINAL CALL    Jamal Gill, DL    [phone]  [facility] Coordinator            Was this an external facility discharge? No Discharge Facility:     Noted following upcoming appointments from discharge chart review:   [facility] follow up appointment(s): No future appointments.   Non-[facility] follow up appointment(s):